# Patient Record
Sex: MALE | Race: BLACK OR AFRICAN AMERICAN | Employment: FULL TIME | URBAN - METROPOLITAN AREA
[De-identification: names, ages, dates, MRNs, and addresses within clinical notes are randomized per-mention and may not be internally consistent; named-entity substitution may affect disease eponyms.]

---

## 2017-11-26 ENCOUNTER — HOSPITAL ENCOUNTER (OUTPATIENT)
Age: 57
Discharge: HOME | End: 2017-11-26
Attending: EMERGENCY MEDICINE

## 2017-11-26 VITALS
HEART RATE: 76 BPM | BODY MASS INDEX: 28.85 KG/M2 | TEMPERATURE: 99 F | WEIGHT: 232 LBS | DIASTOLIC BLOOD PRESSURE: 85 MMHG | SYSTOLIC BLOOD PRESSURE: 122 MMHG | OXYGEN SATURATION: 98 % | HEIGHT: 75 IN | RESPIRATION RATE: 16 BRPM

## 2017-11-26 DIAGNOSIS — M10.9 ACUTE GOUT OF RIGHT WRIST, UNSPECIFIED CAUSE: Primary | ICD-10-CM

## 2017-11-26 PROCEDURE — 99203 OFFICE O/P NEW LOW 30 MIN: CPT

## 2017-11-26 PROCEDURE — 99204 OFFICE O/P NEW MOD 45 MIN: CPT

## 2017-11-26 RX ORDER — IBUPROFEN 800 MG/1
800 TABLET ORAL EVERY 6 HOURS PRN
COMMUNITY

## 2017-11-26 RX ORDER — PREDNISONE 20 MG/1
60 TABLET ORAL DAILY
Qty: 15 TABLET | Refills: 0 | Status: SHIPPED | OUTPATIENT
Start: 2017-11-26 | End: 2017-12-01

## 2017-11-26 NOTE — ED PROVIDER NOTES
Patient presents with:  Swelling Edema (cardiovascular, metabolic)    HPI:     Jean-Pierre Ortiz is a 64year old male who presents with chief complaint of R wrist pain. Hx of gout.   Has had numerous attacks in the feet and the R wrist in the past.  This

## 2017-11-26 NOTE — ED INITIAL ASSESSMENT (HPI)
Patient has a history of gout. He believes he is having a flare up in his right wrist. No injury. Area is red and swollen. Pain increases with use. His symptoms started last Monday.

## 2019-08-06 ENCOUNTER — APPOINTMENT (OUTPATIENT)
Dept: OTHER | Facility: HOSPITAL | Age: 59
End: 2019-08-06
Attending: PREVENTIVE MEDICINE

## 2019-08-30 ENCOUNTER — HOSPITAL ENCOUNTER (OUTPATIENT)
Age: 59
Discharge: HOME | End: 2019-08-30
Attending: FAMILY MEDICINE
Payer: COMMERCIAL

## 2019-08-30 VITALS
DIASTOLIC BLOOD PRESSURE: 94 MMHG | HEART RATE: 98 BPM | TEMPERATURE: 98 F | RESPIRATION RATE: 18 BRPM | OXYGEN SATURATION: 99 % | SYSTOLIC BLOOD PRESSURE: 161 MMHG

## 2019-08-30 DIAGNOSIS — M10.9 ACUTE GOUT OF RIGHT WRIST, UNSPECIFIED CAUSE: Primary | ICD-10-CM

## 2019-08-30 PROCEDURE — 99214 OFFICE O/P EST MOD 30 MIN: CPT

## 2019-08-30 PROCEDURE — 99213 OFFICE O/P EST LOW 20 MIN: CPT

## 2019-08-30 RX ORDER — PREDNISONE 50 MG/1
50 TABLET ORAL DAILY
Qty: 5 TABLET | Refills: 0 | Status: SHIPPED | OUTPATIENT
Start: 2019-08-30 | End: 2019-09-04

## 2019-08-30 NOTE — ED INITIAL ASSESSMENT (HPI)
Hx of gout. States is supposed to me taking allopurinal daily and has not been taking it. Past few days increasing pain and swelling.

## 2019-08-31 NOTE — ED PROVIDER NOTES
Patient Seen in: [facility] In [place]    History   Patient presents with:  Swelling Edema (cardiovascular, metabolic)    Stated Complaint: R wrist pain    HPI    60-year-old male with a history of gout presents for right wrist pain.   States he Sensation and pulses intact. Neurological: He is alert and oriented to person, place, and time. Skin: Skin is warm and dry. ED Course   Labs Reviewed - No data to display    MDM     Patient presents for acute right wrist pain for past 1 week.   Rig Home

## 2020-02-23 ENCOUNTER — HOSPITAL ENCOUNTER (OUTPATIENT)
Age: 60
Discharge: HOME | End: 2020-02-23
Attending: FAMILY MEDICINE
Payer: COMMERCIAL

## 2020-02-23 VITALS
TEMPERATURE: 98 F | OXYGEN SATURATION: 99 % | HEART RATE: 66 BPM | SYSTOLIC BLOOD PRESSURE: 143 MMHG | BODY MASS INDEX: 31 KG/M2 | WEIGHT: 250 LBS | RESPIRATION RATE: 16 BRPM | DIASTOLIC BLOOD PRESSURE: 100 MMHG

## 2020-02-23 DIAGNOSIS — M10.031 ACUTE IDIOPATHIC GOUT OF RIGHT WRIST: Primary | ICD-10-CM

## 2020-02-23 PROCEDURE — 99214 OFFICE O/P EST MOD 30 MIN: CPT

## 2020-02-23 PROCEDURE — 99213 OFFICE O/P EST LOW 20 MIN: CPT

## 2020-02-23 RX ORDER — PREDNISONE 20 MG/1
20 TABLET ORAL 2 TIMES DAILY
Qty: 10 TABLET | Refills: 0 | Status: SHIPPED | OUTPATIENT
Start: 2020-02-23 | End: 2020-02-28

## 2020-02-23 NOTE — ED INITIAL ASSESSMENT (HPI)
Pt sts swelling and pain, warmth to right wrist x 6 days. No known injury. Hx of gout to right wrist in the past, most recent in 8/2019. Pt is right handed.

## 2020-02-23 NOTE — ED PROVIDER NOTES
Patient Seen in: [id number] [facility] [place]      History   Patient presents with:  Upper Extremity Injury    Stated Complaint: Hand swelling    HPI  44-year-old gentleman with a painful swelling of his right wrist for 6 days now, no known injury, vasques discharge. Conjunctiva/sclera: Conjunctivae normal.      Pupils: Pupils are equal, round, and reactive to light. Neck:      Musculoskeletal: Normal range of motion and neck supple. Thyroid: No thyromegaly.    Cardiovascular:      Rate and Rhythm days.  Henry Waddell: 10 tablet Refills: 0

## 2020-02-25 NOTE — ED NOTES
Pt called and states that the prednisone was not working. Pt asked about a possible toradol shot. It was explained to the pt that  He would have to come back and be seen or call pmd. Discussed stopping the prednisone and trying motrin or aleve.